# Patient Record
Sex: MALE | Race: WHITE | Employment: FULL TIME | URBAN - METROPOLITAN AREA
[De-identification: names, ages, dates, MRNs, and addresses within clinical notes are randomized per-mention and may not be internally consistent; named-entity substitution may affect disease eponyms.]

---

## 2018-11-11 ENCOUNTER — HOSPITAL ENCOUNTER (OUTPATIENT)
Dept: ULTRASOUND IMAGING | Age: 28
Discharge: HOME | End: 2018-11-11
Payer: COMMERCIAL

## 2018-11-11 DIAGNOSIS — R10.2 PELVIC PAIN IN MALE: ICD-10-CM

## 2018-11-11 PROCEDURE — 76705 ECHO EXAM OF ABDOMEN: CPT

## 2019-02-21 ENCOUNTER — OFFICE VISIT (OUTPATIENT)
Dept: ORTHOPEDIC SURGERY | Age: 29
End: 2019-02-21
Payer: COMMERCIAL

## 2019-02-21 VITALS — BODY MASS INDEX: 25.12 KG/M2 | WEIGHT: 160.05 LBS | HEIGHT: 67 IN

## 2019-02-21 DIAGNOSIS — S76.011A STRAIN OF FLEXOR MUSCLE OF RIGHT HIP, INITIAL ENCOUNTER: ICD-10-CM

## 2019-02-21 DIAGNOSIS — M25.551 RIGHT HIP PAIN: Primary | ICD-10-CM

## 2019-02-21 PROCEDURE — 99203 OFFICE O/P NEW LOW 30 MIN: CPT | Performed by: PHYSICIAN ASSISTANT

## 2019-02-21 RX ORDER — DICLOFENAC SODIUM 75 MG/1
75 TABLET, DELAYED RELEASE ORAL 2 TIMES DAILY WITH MEALS
Qty: 40 TABLET | Refills: 0 | Status: SHIPPED | OUTPATIENT
Start: 2019-02-21 | End: 2020-10-15 | Stop reason: ALTCHOICE

## 2020-06-17 ENCOUNTER — OFFICE VISIT (OUTPATIENT)
Dept: ORTHOPEDIC SURGERY | Age: 30
End: 2020-06-17

## 2020-06-17 VITALS — HEIGHT: 67 IN | WEIGHT: 160 LBS | BODY MASS INDEX: 25.11 KG/M2

## 2020-06-17 PROCEDURE — 99203 OFFICE O/P NEW LOW 30 MIN: CPT | Performed by: PHYSICIAN ASSISTANT

## 2020-06-17 NOTE — PROGRESS NOTES
Remember that it may take several business days to pre-cert your MRI through your insurance. Our office will contact you as soon as we have the approval. We will not give any test results over the phone. Please call [phone] once you have your test day and time to schedule a follow up with Dr. Isatu Taylor. Order Specific Question:   Reason for exam:     Answer:   r/o mmt       Treatment Plan: I would recommend an MRI in his right knee as a suspect medial meniscus tear. We will have him follow-up with 1 of our sports medicine physicians after the MRIs been completed discuss the results and go over the options for treatment.

## 2020-06-24 ENCOUNTER — OFFICE VISIT (OUTPATIENT)
Dept: ORTHOPEDIC SURGERY | Age: 30
End: 2020-06-24

## 2020-06-24 PROCEDURE — 99213 OFFICE O/P EST LOW 20 MIN: CPT | Performed by: PHYSICIAN ASSISTANT

## 2020-06-24 NOTE — PROGRESS NOTES
Chief Complaint    Knee Pain (rt knee pain after a soccer injury few months back, seen in [address] [place] [address] on 6/17/20; has MRI)      History of Present Illness:  Donnie Snow is a 34 y.o. male returns today for a follow-up on his right knee after undergoing a recent MRI. This was a result of an injury where the patient reports that back in February he was playing soccer when he attempted to make cutting move and had his right foot planted. At the same time another player and he may contact with their knees and he had a twisting injury. Results of the MRI indicate there is a subacute/chronic low-grade sprain of the MCL without any active inflammation. There is no evidence of any traumatic medial lateral meniscus tears. There is some mild patellofemoral maltracking present as well. Pain Assessment  Location of Pain: Knee  Location Modifiers: Right  Severity of Pain: 0  Aggravating Factors: Standing, Walking, Bending  Limiting Behavior: Some  Result of Injury: Yes  Work-Related Injury: No  Are there other pain locations you wish to document?: No    Medical History:  No past medical history on file.   Patient Active Problem List    Diagnosis Date Noted    Strain of flexor muscle of right hip 02/21/2019    Peroneal tendonitis 04/23/2013     Social History     Socioeconomic History    Marital status: Single     Spouse name: Not on file    Number of children: Not on file    Years of education: Not on file    Highest education level: Not on file   Occupational History    Not on file   Social Needs    Financial resource strain: Not on file    Food insecurity     Worry: Not on file     Inability: Not on file    Transportation needs     Medical: Not on file     Non-medical: Not on file   Tobacco Use    Smoking status: Never Smoker    Smokeless tobacco: Never Used   Substance and Sexual Activity    Alcohol use: No    Drug use: No    Sexual activity: Not on file   Lifestyle    Physical activity     Days per

## 2020-10-15 ENCOUNTER — OFFICE VISIT (OUTPATIENT)
Dept: ORTHOPEDIC SURGERY | Age: 30
End: 2020-10-15

## 2020-10-15 VITALS — HEIGHT: 67 IN | BODY MASS INDEX: 25.11 KG/M2 | WEIGHT: 160 LBS

## 2020-10-15 PROCEDURE — 99213 OFFICE O/P EST LOW 20 MIN: CPT | Performed by: PHYSICIAN ASSISTANT

## 2020-10-15 NOTE — PROGRESS NOTES
CHIEF COMPLAINT:    Chief Complaint   Patient presents with    Ankle Pain     LEFT ANKLE-ACHILLES. ABOUT 12 WEEKS AGO INJURED SOCCER       HISTORY OF PRESENT ILLNESS:                The patient is a 31 y.o. male presents today for left ankle evaluation. He had an injury 12 weeks ago while playing soccer. He collided with an opponent and plantarflexed and inverted his left ankle. He denies feeling a pop or crack at the time of injury. He has had pain on the lateral aspect of the ankle as well as in his Achilles. He is treated his symptoms with ice, anti-inflammatory medications, elevation, and activity modification. Since he has had continued pain, he decided to have his ankle evaluated. He is a prior history of a Sullivan fracture in 2013 that required ORIF. No past medical history on file. The pain assessment was noted & is as follows:  Pain Assessment  Location of Pain: Ankle  Location Modifiers: Left  Severity of Pain: 3  Quality of Pain: Sharp, Aching, Dull  Duration of Pain: A few minutes  Frequency of Pain: Several times daily  Aggravating Factors: Exercise, Stairs, Bending, Stretching  Limiting Behavior: Some  Relieving Factors: Rest, Ice  Result of Injury: Yes  Work-Related Injury: No  Are there other pain locations you wish to document?: No]      Work Status/Functionality:     Past Medical History: Medical history form was reviewed today & can be found in the media tab  No past medical history on file. Past Surgical History:     No past surgical history on file. Current Medications:     Current Outpatient Medications:     Loratadine (CLARITIN PO), Take  by mouth., Disp: , Rfl:   Allergies:  Patient has no known allergies. Social History:    reports that he has never smoked. He has never used smokeless tobacco. He reports that he does not drink alcohol or use drugs. Family History:   No family history on file.     REVIEW OF SYSTEMS:   Pertinent items are noted in HPI  Review of systems reviewed from Patient History Form dated on October 15, 2020 and available in the patient's chart under the Media tab. CONSTITUTIONAL: Denies unexplained weight loss, fevers, chills or fatigue  NEUROLOGICAL: Denies unsteady gait or progressive weakness  SKIN: Denies skin changes, delayed healing, rash, itching     PHYSICAL EXAMINATION:   Vitals: Height 5' 7.01\" (1.702 m), weight 160 lb (72.6 kg). GENERAL EXAM:  ? General Apparence: Patient is adequately groomed with no evidence of malnutrition. ? Orientation: The patient is oriented to time, place and person. ? Mood & Affect:The patient's mood and affect are appropriate     PHYSICAL EXAMINATION:  Inspection of the left ankle reveals warm, dry, intact skin. There is no adenopathy. The distal neurovascular exam is grossly intact. There is 2+ effusion about the left ankle. There is some tenderness in the lateral aspect the left ankle. There is no tenderness over the anterior tib-fib joint. Range of motion is severely limited in all planes. He has 5 out of 5 strength in all muscle groups. There is 2+ laxity with an anterior drawer test.    Examination of the contralateral ankle reveals intact skin. There is no swelling, warmth, or erythema. Range of motion is within normal limits. There is no tenderness to palpation about the joint line, lateral ligamentous complex, deltoid ligament, syndesmosis, or Achilles tendon. Strength is graded 5/5 in all muscle groups. Provocative instability tests are negative. The distal neurovascular exam is grossly intact. Examination of the lumbar spine reveals that the skin is warm and dry. There is no swelling, warmth, or erythema. Range of motion is within normal limits. There is no paraspinal or spinous process tenderness. Ipsilateral and contralateral straight leg raising tests are negative. The distal neurovascular exam is grossly intact and symmetric. X-RAYS: 3 views of the left ankle are reviewed.  There is no periosteal reaction, medullary lesions, or osteopenia. The mortise is intact, although there may be some mild widening. No fractures or dislocations are visualized. There is a retained metal screw from previous Sullivan fracture fixation. ASSESSMENT: Left ankle inversion and high ankle sprain. PLAN: I had a detailed discussion with Abdiel Malloy. I recommended continue to use ice, elevation, anti-inflammatory medications, and activity modification. I recommended a course of physical therapy to help decrease his swelling and maximize his range of motion and strength. I recommended a follow-up evaluation with Dr. Sammy Luis or Dr. Thiago Reyes in the next 3 weeks. He is in agreement with this plan.         Brooklyn Chery ATC, PA-C

## 2020-11-03 ENCOUNTER — OFFICE VISIT (OUTPATIENT)
Dept: ORTHOPEDIC SURGERY | Age: 30
End: 2020-11-03

## 2020-11-03 VITALS — HEIGHT: 67 IN | WEIGHT: 160.05 LBS | BODY MASS INDEX: 25.12 KG/M2

## 2020-11-03 PROCEDURE — 99203 OFFICE O/P NEW LOW 30 MIN: CPT | Performed by: ORTHOPAEDIC SURGERY

## 2020-11-03 RX ORDER — MELOXICAM 15 MG/1
15 TABLET ORAL DAILY
Qty: 30 TABLET | Refills: 1 | Status: SHIPPED | OUTPATIENT
Start: 2020-11-03 | End: 2021-03-03

## 2020-11-03 NOTE — PROGRESS NOTES
ankle    Palpation: Tenderness over the anterior lateral aspect of the ankle and posterior medial.  There is no tenderness over the syndesmosis    Range of Motion: Tightness into dorsiflexion on the left compared to the right    Strength: Within normal limits    Special Tests: Decreased proprioception on the left side compared to the right anterior drawer and talar tilt show laxity on the left compared to the right    Skin: There are no rashes, ulcerations or lesions. Gait: Antalgic    Reflex 2+ and symmetric    Additional Comments:       Additional Examinations:         Right Lower Extremity: Examination of the right lower extremity does not show any tenderness, deformity or injury. Range of motion is unremarkable. There is no gross instability. There are no rashes, ulcerations or lesions. Strength and tone are normal.    Radiology:     X-rays obtained and reviewed in office:  Views 3  Location left ankle  Impression obtained previously shows no evidence of fracture or osteochondral defect mortise is well reduced          Assessment : This patient has a recurrent ankle sprain which is slow to resolve. He has been running on this did not use his brace. Has not really done much to take care of it    Impression:  No diagnosis found. Office Procedures:  No orders of the defined types were placed in this encounter. Treatment Plan:  The etiology of ankle effusion and instability and it's appropriate treatment were discussed in great detail. Out his plan of care and copied it to the media section of his chart I gave him meloxicam 15 mg that he will take for just a short period of time and discussed the side effects. Gave him a prescription for physical therapy 1 time for home program and recommended he use a brace on a regular basis until his achiness goes down. He needs to stop running and will do crosstraining.   Follow-up with me as needed if he still has problems in the future he will contact us and I would scan him

## 2020-12-02 ENCOUNTER — PATIENT MESSAGE (OUTPATIENT)
Dept: ORTHOPEDIC SURGERY | Age: 30
End: 2020-12-02

## 2020-12-10 ENCOUNTER — TELEPHONE (OUTPATIENT)
Dept: ORTHOPEDIC SURGERY | Age: 30
End: 2020-12-10

## 2020-12-15 ENCOUNTER — HOSPITAL ENCOUNTER (OUTPATIENT)
Dept: PHYSICAL THERAPY | Age: 30
Setting detail: THERAPIES SERIES
Discharge: HOME | End: 2020-12-15

## 2020-12-15 PROCEDURE — 97110 THERAPEUTIC EXERCISES: CPT | Performed by: PHYSICAL THERAPIST

## 2020-12-15 PROCEDURE — 97161 PT EVAL LOW COMPLEX 20 MIN: CPT | Performed by: PHYSICAL THERAPIST

## 2020-12-15 NOTE — FLOWSHEET NOTE
[facility], [address] [place]  Phone: [phone]  Fax [phone]    Physical Therapy Treatment Note/ Progress Report:           Date:  12/15/2020    Patient Name:  Author Verduzco    :  1990  MRN: [id number]  Restrictions/Precautions:    Medical/Treatment Diagnosis Information:  · Diagnosis: Left ankle pain (M25.572)  · Treatment Diagnosis: Left ankle pain (S61.106)  Insurance/Certification information:  PT Insurance Information: Self pay  Physician Information:  Referring Practitioner: Carlo Beckford  Has the plan of care been signed (Y/N):        []  Yes  [x]  No     Date of Patient follow up with Physician: not scheduled      Is this a Progress Report:     []  Yes  [x]  No        If Yes:  Date Range for reporting period:  Beginnin/15/2020  Ending    Progress report will be due (10 Rx or 30 days whichever is less):        Recertification will be due (POC Duration  / 90 days whichever is less):       Visit # Insurance Allowable Auth Required   In-person 1 Self pay []  Yes []  No    Telehealth   []  Yes []  No    Total          Functional Scale: LEFS 59/80 (26%)    Date assessed:  12/15/2020      Therapy Diagnosis/Practice Pattern: D      Number of Comorbidities:  [x]0     []1-2    []3+    Latex Allergy:  [x]NO      []YES  Preferred Language for Healthcare:   [x]English       []other:      Pain level:  0-7/10     SUBJECTIVE:  See eval    OBJECTIVE: See eval   Observation:    Test measurements:      RESTRICTIONS/PRECAUTIONS:  none    Exercises/Interventions:     Therapeutic Ex (98945) Sets/sec Reps Notes/CUES   Standing gastroc stretch 30\" 3    Standing soleus stretch 30\" 3    BTB ankle EV/INV 2 10 ea    DHR 2 10    SLR abduction 2 10     Clamshells RTB 2 10                Patient ed   HEP, ice, GAP                     Manual Intervention (..97.60)                                          NMR re-education (76990)   CUES NEEDED   SLB 15\" 5x                                                    Therapeutic Activity (73627)                                                Therapeutic Exercise and NMR EXR  [x] (62471) Provided verbal/tactile cueing for activities related to strengthening, flexibility, endurance, ROM for improvements in LE, proximal hip, and core control with self care, mobility, lifting, ambulation.  [] (09717) Provided verbal/tactile cueing for activities related to improving balance, coordination, kinesthetic sense, posture, motor skill, proprioception  to assist with LE, proximal hip, and core control in self care, mobility, lifting, ambulation and eccentric single leg control.      NMR and Therapeutic Activities:    [] (63795 or 59558) Provided verbal/tactile cueing for activities related to improving balance, coordination, kinesthetic sense, posture, motor skill, proprioception and motor activation to allow for proper function of core, proximal hip and LE with self care and ADLs  [] (39651) Gait Re-education- Provided training and instruction to the patient for proper LE, core and proximal hip recruitment and positioning and eccentric body weight control with ambulation re-education including up and down stairs     Home Exercise Program:    [x] (21761) Reviewed/Progressed HEP activities related to strengthening, flexibility, endurance, ROM of core, proximal hip and LE for functional self-care, mobility, lifting and ambulation/stair navigation   [] (58197)Reviewed/Progressed HEP activities related to improving balance, coordination, kinesthetic sense, posture, motor skill, proprioception of core, proximal hip and LE for self care, mobility, lifting, and ambulation/stair navigation      Manual Treatments:  PROM / STM / Oscillations-Mobs:  G-I, II, III, IV (PA's, Inf., Post.)  [] (12134) Provided manual therapy to mobilize LE, proximal hip and/or LS spine soft tissue/joints for the purpose of modulating increased symptoms or restriction. []? Progressing: []? Met: []? Not Met: []? Adjusted  5. Patient will progress to [employer] for return to running or sport. (patient specific functional goal)    []? Progressing: []? Met: []? Not Met: []? Adjusted         Progression Towards Functional goals:  [] Patient is progressing as expected towards functional goals listed. [] Progression is slowed due to complexities listed. [] Progression has been slowed due to co-morbidities. [x] Plan just implemented, too soon to assess goals progression  [] Other:         Overall Progression Towards Functional goals/ Treatment Progress Update:  [] Patient is progressing as expected towards functional goals listed. [] Progression is slowed due to complexities/Impairments listed. [] Progression has been slowed due to co-morbidities.   [x] Plan just implemented, too soon to assess goals progression <30days   [] Goals require adjustment due to lack of progress  [] Patient is not progressing as expected and requires additional follow up with physician  [] Other    Prognosis for POC: [x] Good [] Fair  [] Poor      Patient requires continued skilled intervention: [x] Yes  [] No    Treatment/Activity Tolerance:  [x] Patient able to complete treatment  [] Patient limited by fatigue  [] Patient limited by pain    [] Patient limited by other medical complications  [] Other:     ASSESSMENT:  See lili     Return to Play: (if applicable)   []  Stage 1: Intro to Strength   []  Stage 2: Return to Run and Strength   []  Stage 3: Return to Jump and Strength   []  Stage 4: Dynamic Strength and Agility   []  Stage 5: Sport Specific Training     []  Ready to Return to Play, Meets All Above Stages   []  Not Ready for Return to Sports   Comments:                               PLAN: See eval  [] Continue per plan of care [] Alter current plan (see comments above)  [x] Plan of care initiated [] Hold pending MD visit [] Discharge      Electronically signed by: Jenny Washington, PT, DPT [id number]     Note: If patient does not return for scheduled/ recommended follow up visits, this note will serve as a discharge from care along with most recent update on progress.

## 2020-12-15 NOTE — PLAN OF CARE
[facility], [address] [facility]  [address] [place] [address]  Phone: [phone]  Fax [phone]     Physical Therapy Certification    Dear Referring Practitioner: Fantasma Foster,    We had the pleasure of evaluating the following patient for physical therapy services at [address] [place] [address]. A summary of our findings can be found in the initial assessment below. This includes our plan of care. If you have any questions or concerns regarding these findings, please do not hesitate to contact me at the office phone number checked above. Thank you for the referral.       Physician Signature:_______________________________Date:__________________  By signing above (or electronic signature), therapists plan is approved by physician    Patient: Razia Pierre   : 1990   MRN: [id number]  Referring Physician: Referring Practitioner: Fantasma Foster      Evaluation Date: 12/15/2020      Medical Diagnosis Information:  Diagnosis: Left ankle pain (M25.572)   Treatment Diagnosis: Left ankle pain (M25.572)                                         Insurance information: PT Insurance Information: Self pay       Precautions/ Contra-indications: none    C-SSRS Triggered by Intake questionnaire (Past 2 wk assessment):   [x] No, Questionnaire did not trigger screening.   [] Yes, Patient intake triggered further evaluation      [] C-SSRS Screening completed  [] PCP notified via Plan of Care  [] Emergency services notified     Latex Allergy:  [x]NO      []YES  Preferred Language for Healthcare:   [x]English       []other:    SUBJECTIVE: Patient stated complaint: Patient reports he has had off and on pain in his left ankle over the years. Most recently, sprained his ankle about 6 months ago playing soccer when someone stepped on his ankle when he planted, and his foot when inward. Reports immediate pain and had to stop playing.  Became pretty swollen and he had trouble putting weight on it for a few days. Wore a boot for a day or two and then was able to walk a little better. Continued to work a physical job, which did not help much with the recovery. Reports that currently, it is still painful on the outside of the ankle and in the back near his achilles. Relevant Medical History: no significant PMH  Functional Disability Index:  LEFS 59/80(26%)    Height: 5'7 Weight: 160  Pain Scale: 0-7/10  Easing factors: ice, rest, meloxicam   Provocative factors: turning foot inward, running, prolonged walking or standing activities, changing directions      Type: []Constant   []Intermittent  []Radiating []Localized []other:     Numbness/Tingling: denies N/T    Occupation/School: works as a ; on his feet a lot    Living Status/Prior Level of Function: Independent with ADLs and IADLs, wants to return to playing soccer and lifting    OBJECTIVE:     ROM LEFT RIGHT   HIP Flex     HIP Abd     HIP Ext     HIP IR     HIP ER     Knee ext     Knee Flex     Ankle PF 35    Ankle DF 5    Ankle In 40    Ankle Ev 5    Strength  LEFT RIGHT   HIP Flexors     HIP Abductors     HIP Ext     Hip ER     Knee EXT (quad)     Knee Flex (HS)     Ankle DF 5/5    Ankle PF 4/5    Ankle Inv 4+/5    Ankle EV 4+/5         Circumference  Mid apex  7 cm prox             Reflexes/Sensation:    [x]Dermatomes/Myotomes intact    []Reflexes equal and normal bilaterally   []Other:    Joint mobility:    []Normal    [x]Hypo   []Hyper    Palpation: no significant TTP; palpable tightness gastroc    Functional Mobility/Transfers: independent    Posture: fair    Bandages/Dressings/Incisions: N/A    Gait: (include devices/WB status) decreased tibial advancement over talus LLE, causing decreased step length    Orthopedic Special Tests: talar tilt -                       [x] Patient history, allergies, meds reviewed. Medical chart reviewed. See intake form.      Review Of Systems (ROS):  [x]Performed Review of systems (Integumentary, CardioPulmonary, Neurological) by intake and observation. Intake form has been scanned into medical record. Patient has been instructed to contact their primary care physician regarding ROS issues if not already being addressed at this time. Co-morbidities/Complexities (which will affect course of rehabilitation):   [x]None           Arthritic conditions   []Rheumatoid arthritis (M05.9)  []Osteoarthritis (M19.91)   Cardiovascular conditions   []Hypertension (I10)  []Hyperlipidemia (E78.5)  []Angina pectoris (I20)  []Atherosclerosis (I70)   Musculoskeletal conditions   []Disc pathology   []Congenital spine pathologies   []Prior surgical intervention  []Osteoporosis (M81.8)  []Osteopenia (M85.8)   Endocrine conditions   []Hypothyroid (E03.9)  []Hyperthyroid Gastrointestinal conditions   []Constipation (V33.32)   Metabolic conditions   []Morbid obesity (E66.01)  []Diabetes type 1(E10.65) or 2 (E11.65)   []Neuropathy (G60.9)     Pulmonary conditions   []Asthma (J45)  []Coughing   []COPD (J44.9)   Psychological Disorders  []Anxiety (F41.9)  []Depression (F32.9)   []Other:   []Other:          Barriers to/and or personal factors that will affect rehab potential:              [x]Age  []Sex              []Motivation/Lack of Motivation                        [x]Co-Morbidities              []Cognitive Function, education/learning barriers              []Environmental, home barriers              []profession/work barriers  []past PT/medical experience  []other:  Justification: should progress well     Falls Risk Assessment (30 days):   [x] Falls Risk assessed and no intervention required.   [] Falls Risk assessed and Patient requires intervention due to being higher risk   TUG score (>12s at risk):     [] Falls education provided, including         ASSESSMENT: Patient demonstrates decreased ROM, strength, and stability in his left ankle, limiting his ability to complete prolonged walking and standing or higher level activity without pain. Will benefit from skilled PT to address limitations. Functional Impairments:     [x]Noted lumbar/proximal hip/LE joint hypomobility   [x]Decreased LE functional ROM   [x]Decreased core/proximal hip strength and neuromuscular control   [x]Decreased LE functional strength   [x]Reduced balance/proprioceptive control   []other:      Functional Activity Limitations (from functional questionnaire and intake)   []Reduced ability to tolerate prolonged functional positions   []Reduced ability or difficulty with changes of positions or transfers between positions   []Reduced ability to maintain good posture and demonstrate good body mechanics with sitting, bending, and lifting   []Reduced ability to sleep   [] Reduced ability or tolerance with driving and/or computer work   []Reduced ability to perform lifting, carrying tasks   [x]Reduced ability to squat   []Reduced ability to forward bend   [x]Reduced ability to ambulate prolonged functional periods/distances/surfaces   []Reduced ability to ascend/descend stairs   [x]Reduced ability to run, hop, cut or jump   []other:    Participation Restrictions   []Reduced participation in self care activities   []Reduced participation in home management activities   []Reduced participation in work activities   []Reduced participation in social activities. [x]Reduced participation in sport/recreation activities. Classification :    []Signs/symptoms consistent with post-surgical status including decreased ROM, strength and function.    [x]Signs/symptoms consistent with joint sprain/strain   []Signs/symptoms consistent with patella-femoral syndrome   []Signs/symptoms consistent with knee OA/hip OA   []Signs/symptoms consistent with internal derangement of knee/Hip   []Signs/symptoms consistent with functional hip weakness/NMR control      []Signs/symptoms consistent with tendinitis/tendinosis    []signs/symptoms consistent with pathology which may benefit from Dry needling      []other:      Prognosis/Rehab Potential:      []Excellent   [x]Good    []Fair   []Poor    Tolerance of evaluation/treatment:    []Excellent   []Good    [x]Fair   []Poor  Physical Therapy Evaluation Complexity Justification  [x] A history of present problem with:  [x] no personal factors and/or comorbidities that impact the plan of care;  []1-2 personal factors and/or comorbidities that impact the plan of care  []3 personal factors and/or comorbidities that impact the plan of care  [x] An examination of body systems using standardized tests and measures addressing any of the following: body structures and functions (impairments), activity limitations, and/or participation restrictions;:  [x] a total of 1-2 or more elements   [] a total of 3 or more elements   [] a total of 4 or more elements   [x] A clinical presentation with:  [x] stable and/or uncomplicated characteristics   [] evolving clinical presentation with changing characteristics  [] unstable and unpredictable characteristics;   [x] Clinical decision making of [x] low, [] moderate, [] high complexity using standardized patient assessment instrument and/or measurable assessment of functional outcome. [x] EVAL (LOW) 49970 (typically 20 minutes face-to-face)  [] EVAL (MOD) 74207 (typically 30 minutes face-to-face)  [] EVAL (HIGH) 51867 (typically 45 minutes face-to-face)  [] RE-EVAL       PLAN:   Frequency/Duration:  1 days per week for 6 Weeks:  Interventions:  [x]  Therapeutic exercise including: strength training, ROM, for Lower extremity and core   [x]  NMR activation and proprioception for LE, Glutes and Core   [x]  Manual therapy as indicated for LE, Hip and spine to include: Dry Needling/IASTM, STM, PROM, Gr I-IV mobilizations, manipulation.    [x] Modalities as needed that may include: thermal agents, E-stim, Biofeedback, US, iontophoresis as indicated  [x] Patient education on joint protection, postural re-education, activity modification, progression of HEP. HEP instruction: (see scanned forms)    GOALS:  Patient stated goal: \"Return to sports\"  [] Progressing: [] Met: [] Not Met: [] Adjusted    Therapist goals for Patient:   Short Term Goals: To be achieved in: 2 weeks  1. Independent in HEP and progression per patient tolerance, in order to prevent re-injury. [] Progressing: [] Met: [] Not Met: [] Adjusted  2. Patient will have a decrease in pain to facilitate improvement in movement, function, and ADLs as indicated by Functional Deficits. [] Progressing: [] Met: [] Not Met: [] Adjusted    Long Term Goals: To be achieved in: 6 weeks  1. Disability index score of 13% or less for the LEFS to assist with reaching prior level of function. [] Progressing: [] Met: [] Not Met: [] Adjusted  2. Patient will demonstrate increased AROM left ankle DF to 10 degrees, left eversion 10 degrees to allow for proper joint functioning as indicated by patients Functional Deficits. [] Progressing: [] Met: [] Not Met: [] Adjusted  3. Patient will demonstrate an increase in Strength in left ankle PF to 4+/5 to allow for proper functional mobility as indicated by patients Functional Deficits. [] Progressing: [] Met: [] Not Met: [] Adjusted  4. Patient will be able to balance on uneven surface for 1 minute without increased symptoms or restriction. [] Progressing: [] Met: [] Not Met: [] Adjusted  5.  Patient will progress to [facility] for return to running or sport. (patient specific functional goal)    [] Progressing: [] Met: [] Not Met: [] Adjusted     Electronically signed by:  Jonathan Maldonado PT, DPT [id number]

## 2020-12-22 ENCOUNTER — HOSPITAL ENCOUNTER (OUTPATIENT)
Dept: PHYSICAL THERAPY | Age: 30
Setting detail: THERAPIES SERIES
Discharge: HOME | End: 2020-12-22

## 2020-12-22 PROCEDURE — 97140 MANUAL THERAPY 1/> REGIONS: CPT | Performed by: PHYSICAL THERAPIST

## 2020-12-22 PROCEDURE — 97110 THERAPEUTIC EXERCISES: CPT | Performed by: PHYSICAL THERAPIST

## 2020-12-22 NOTE — FLOWSHEET NOTE
[facility],  [address]  Phone: [phone]  Fax [phone]    Physical Therapy Treatment Note/ Progress Report:           Date:  2020    Patient Name:  Dwight Jones    :  1990  MRN: [id number]  Restrictions/Precautions:    Medical/Treatment Diagnosis Information:  · Diagnosis: Left ankle pain (M25.572)  · Treatment Diagnosis: Left ankle pain (I83.405)  Insurance/Certification information:  PT Insurance Information: Self pay  Physician Information:  Referring Practitioner: Sammy Luis  Has the plan of care been signed (Y/N):        []  Yes  [x]  No     Date of Patient follow up with Physician: not scheduled      Is this a Progress Report:     []  Yes  [x]  No        If Yes:  Date Range for reporting period:  Beginnin/15/2020  Ending    Progress report will be due (10 Rx or 30 days whichever is less):        Recertification will be due (POC Duration  / 90 days whichever is less):       Visit # Insurance Allowable Auth Required   In-person 2 Self pay []  Yes []  No    Telehealth   []  Yes []  No    Total          Functional Scale: LEFS 59/80 (26%)    Date assessed:  12/15/2020      Therapy Diagnosis/Practice Pattern: D      Number of Comorbidities:  [x]0     []1-2    []3+    Latex Allergy:  [x]NO      []YES  Preferred Language for Healthcare:   [x]English       []other:      Pain level:  0-3/10     SUBJECTIVE:  Patient reports his ankle seems about the same. Has been doing exercises regularly. Has been resting it as instructed. Gets a little sore at times, but not much pain lately.      OBJECTIVE:    Observation: tightness with posterior talar mobility    Test measurements:  NT this date    RESTRICTIONS/PRECAUTIONS:  none    Exercises/Interventions:     Therapeutic Ex (19333) Sets/sec Reps Notes/CUES   Incline stretch gastroc and soleus 30\" 3 ea gentle         Seated soleus pumps 40# 2 10 ecc L                   Eccentric heel raises 2 10                LBW RVL  2 laps  15'    Monster walks RVL 2 laps 15'          Squats 3\" 20x          Manual Intervention (70653)      IASTM using HG Pro fanning, sweeping, and strumming to gastroc, soleus, brushing and strumming to achilles  10'     Posterior talar mobilizations (static and MWM with lunge) 5'                             NMR re-education (78312)   CUES NEEDED   SLB on airex 30\" 3                                                    Therapeutic Activity (10163)                                                Therapeutic Exercise and NMR EXR  [x] (75257) Provided verbal/tactile cueing for activities related to strengthening, flexibility, endurance, ROM for improvements in LE, proximal hip, and core control with self care, mobility, lifting, ambulation.  [] (68383) Provided verbal/tactile cueing for activities related to improving balance, coordination, kinesthetic sense, posture, motor skill, proprioception  to assist with LE, proximal hip, and core control in self care, mobility, lifting, ambulation and eccentric single leg control.      NMR and Therapeutic Activities:    [x] (57224 or 81199) Provided verbal/tactile cueing for activities related to improving balance, coordination, kinesthetic sense, posture, motor skill, proprioception and motor activation to allow for proper function of core, proximal hip and LE with self care and ADLs  [] (51616) Gait Re-education- Provided training and instruction to the patient for proper LE, core and proximal hip recruitment and positioning and eccentric body weight control with ambulation re-education including up and down stairs     Home Exercise Program:    [x] (75570) Reviewed/Progressed HEP activities related to strengthening, flexibility, endurance, ROM of core, proximal hip and LE for functional self-care, mobility, lifting and ambulation/stair navigation   [] (57594)Reviewed/Progressed HEP activities related to joint functioning as indicated by patients Functional Deficits. []? Progressing: []? Met: []? Not Met: []? Adjusted  3. Patient will demonstrate an increase in Strength in left ankle PF to 4+/5 to allow for proper functional mobility as indicated by patients Functional Deficits. []? Progressing: []? Met: []? Not Met: []? Adjusted  4. Patient will be able to balance on uneven surface for 1 minute without increased symptoms or restriction. []? Progressing: []? Met: []? Not Met: []? Adjusted  5. Patient will progress to [facility] for return to running or sport. (patient specific functional goal)    []? Progressing: []? Met: []? Not Met: []? Adjusted           Overall Progression Towards Functional goals/ Treatment Progress Update:  [] Patient is progressing as expected towards functional goals listed. [] Progression is slowed due to complexities/Impairments listed. [] Progression has been slowed due to co-morbidities. [x] Plan just implemented, too soon to assess goals progression <30days   [] Goals require adjustment due to lack of progress  [] Patient is not progressing as expected and requires additional follow up with physician  [] Other    Prognosis for POC: [x] Good [] Fair  [] Poor      Patient requires continued skilled intervention: [x] Yes  [] No    Treatment/Activity Tolerance:  [x] Patient able to complete treatment  [] Patient limited by fatigue  [] Patient limited by pain    [] Patient limited by other medical complications  [] Other:     ASSESSMENT:  Patient with tightness in talus as well as soleus, limiting tibial advancement during gait. Tolerated increased TE well.  Will progress to [facility] over the next 2-3 weeks as able due to patient being self-pay    Return to Play: (if applicable)   []  Stage 1: Intro to Strength   []  Stage 2: Return to Run and Strength   []  Stage 3: Return to Jump and Strength   []  Stage 4: Dynamic Strength and Agility   []  Stage 5: Sport Specific Training     []  Ready to Return to Play, Meets All Above Stages   []  Not Ready for Return to Sports   Comments:                               PLAN: See eval  [x] Continue per plan of care [] Alter current plan (see comments above)  [] Plan of care initiated [] Hold pending MD visit [] Discharge      Electronically signed by:  Idania Dixon, PT, DPT [id number]     Note: If patient does not return for scheduled/ recommended follow up visits, this note will serve as a discharge from care along with most recent update on progress.

## 2020-12-29 ENCOUNTER — HOSPITAL ENCOUNTER (OUTPATIENT)
Dept: PHYSICAL THERAPY | Age: 30
Setting detail: THERAPIES SERIES
Discharge: HOME | End: 2020-12-29

## 2020-12-29 PROCEDURE — 97140 MANUAL THERAPY 1/> REGIONS: CPT | Performed by: PHYSICAL THERAPIST

## 2020-12-29 PROCEDURE — 97110 THERAPEUTIC EXERCISES: CPT | Performed by: PHYSICAL THERAPIST

## 2020-12-29 NOTE — FLOWSHEET NOTE
[facility] [facility], [address]  Phone: [phone]  Fax [phone]    Physical Therapy Treatment Note/ Progress Report:           Date:  2020    Patient Name:  Dev Novoa    :  1990  MRN: [id number]  Restrictions/Precautions:    Medical/Treatment Diagnosis Information:  · Diagnosis: Left ankle pain (M25.572)  · Treatment Diagnosis: Left ankle pain (C81.323)  Insurance/Certification information:  PT Insurance Information: Self pay  Physician Information:  Referring Practitioner: Daron Zhang  Has the plan of care been signed (Y/N):        []  Yes  [x]  No     Date of Patient follow up with Physician: not scheduled      Is this a Progress Report:     []  Yes  [x]  No        If Yes:  Date Range for reporting period:  Beginnin/15/2020  Ending    Progress report will be due (10 Rx or 30 days whichever is less): 2993       Recertification will be due (POC Duration  / 90 days whichever is less):       Visit # Insurance Allowable Auth Required   In-person 2 Self pay []  Yes []  No    Telehealth   []  Yes []  No    Total          Functional Scale: LEFS 59/80 (26%)    Date assessed:  12/15/2020      Therapy Diagnosis/Practice Pattern: D      Number of Comorbidities:  [x]0     []1-2    []3+    Latex Allergy:  [x]NO      []YES  Preferred Language for Healthcare:   [x]English       []other:      Pain level:  0/10, up to 2/10 when he does higher level activities. SUBJECTIVE:  Patient reports his ankle was less swollen, but he was playing around with a soccer ball and it got a little sore.      OBJECTIVE:    Observation: tightness with posterior talar mobility    Test measurements:  AROM DF 8 deg, EV 5 deg; PF MMT: 4+/5    RESTRICTIONS/PRECAUTIONS:  none    Exercises/Interventions:     Therapeutic Ex (32616) Sets/sec Reps Notes/CUES   Incline stretch gastroc and soleus 30\" 3 ea gentle         Seated soleus pumps 40# 2 15 ecc L                   SL heel raises L 2 10                      Glider lunge posterior 2 10 L    Squats on DD 3\" 20x    Lunge onto BOSU 5\" 10    Manual Intervention (18226)      IASTM using HG Pro fanning, sweeping, and strumming to gastroc, soleus, brushing and strumming to achilles  10'     Posterior talar mobilizations (static and MWM with lunge) 5'                             NMR re-education (19960)   CUES NEEDED   Step to SLB on airex 5\" 10x ea Forward, lateral         LSU and over BOSU 1 10                                        Therapeutic Activity (01419)                                                Therapeutic Exercise and NMR EXR  [x] (92547) Provided verbal/tactile cueing for activities related to strengthening, flexibility, endurance, ROM for improvements in LE, proximal hip, and core control with self care, mobility, lifting, ambulation.  [] (34660) Provided verbal/tactile cueing for activities related to improving balance, coordination, kinesthetic sense, posture, motor skill, proprioception  to assist with LE, proximal hip, and core control in self care, mobility, lifting, ambulation and eccentric single leg control.      NMR and Therapeutic Activities:    [x] (90420 or 13015) Provided verbal/tactile cueing for activities related to improving balance, coordination, kinesthetic sense, posture, motor skill, proprioception and motor activation to allow for proper function of core, proximal hip and LE with self care and ADLs  [] (23944) Gait Re-education- Provided training and instruction to the patient for proper LE, core and proximal hip recruitment and positioning and eccentric body weight control with ambulation re-education including up and down stairs     Home Exercise Program:    [x] (41551) Reviewed/Progressed HEP activities related to strengthening, flexibility, endurance, ROM of core, proximal hip and LE for functional self-care, mobility, lifting and ambulation/stair navigation   [] (11641)Reviewed/Progressed HEP activities related to improving balance, coordination, kinesthetic sense, posture, motor skill, proprioception of core, proximal hip and LE for self care, mobility, lifting, and ambulation/stair navigation      Manual Treatments:  PROM / STM / Oscillations-Mobs:  G-I, II, III, IV (PA's, Inf., Post.)  [x] (91623) Provided manual therapy to mobilize LE, proximal hip and/or LS spine soft tissue/joints for the purpose of modulating pain, promoting relaxation,  increasing ROM, reducing/eliminating soft tissue swelling/inflammation/restriction, improving soft tissue extensibility and allowing for proper ROM for normal function with self care, mobility, lifting and ambulation. Modalities:     [] GAME READY (VASO)- for significant edema, swelling, pain control. Charges:  Timed Code Treatment Minutes: 35'   Total Treatment Minutes: 35'     [] EVAL (LOW) 455 1011   [] EVAL (MOD) 15309  [] EVAL (HIGH) 89439   [] RE-EVAL     [x] LZ(89956) x 1    [] IONTO  [] NMR (54545) x     [] VASO  [x] Manual (53823) x  1    [] Other:  [] TA x      [] Mech Traction (55061)  [] ES(attended) (24161)      [] ES (un) (46341):       GOALS:   Patient stated goal: \"Return to sports\"  []? Progressing: []? Met: []? Not Met: []? Adjusted     Therapist goals for Patient:   Short Term Goals: To be achieved in: 2 weeks  1. Independent in HEP and progression per patient tolerance, in order to prevent re-injury. []? Progressing: [x]? Met: []? Not Met: []? Adjusted  2. Patient will have a decrease in pain to facilitate improvement in movement, function, and ADLs as indicated by Functional Deficits. []? Progressing: [x]? Met: []? Not Met: []? Adjusted     Long Term Goals: To be achieved in: 6 weeks  1. Disability index score of 13% or less for the LEFS to assist with reaching prior level of function. []? Progressing: []? Met: []? Not Met: []? Adjusted  2.  Patient will demonstrate increased AROM left ankle DF to 10 degrees, left eversion 10 degrees to allow for proper joint functioning as indicated by patients Functional Deficits. [x]? Progressing: []? Met: []? Not Met: []? Adjusted  3. Patient will demonstrate an increase in Strength in left ankle PF to 4+/5 to allow for proper functional mobility as indicated by patients Functional Deficits. []? Progressing: [x]? Met: []? Not Met: []? Adjusted  4. Patient will be able to balance on uneven surface for 1 minute without increased symptoms or restriction. []? Progressing: [x]? Met: []? Not Met: []? Adjusted  5. Patient will progress to [employer] for return to running or sport. (patient specific functional goal)    []? Progressing: []? Met: []? Not Met: []? Adjusted           Overall Progression Towards Functional goals/ Treatment Progress Update:  [x] Patient is progressing as expected towards functional goals listed. [] Progression is slowed due to complexities/Impairments listed. [] Progression has been slowed due to co-morbidities. [] Plan just implemented, too soon to assess goals progression <30days   [] Goals require adjustment due to lack of progress  [] Patient is not progressing as expected and requires additional follow up with physician  [] Other    Prognosis for POC: [x] Good [] Fair  [] Poor      Patient requires continued skilled intervention: [x] Yes  [] No    Treatment/Activity Tolerance:  [x] Patient able to complete treatment  [] Patient limited by fatigue  [] Patient limited by pain    [] Patient limited by other medical complications  [] Other:     ASSESSMENT:  Patient with no pain with daily activities. Challenged by dynamic balance exercises. Edema still present but overall improved. Will schedule for [facility] when he returns from [place] and may do [employer] and concurrent PT if needed.      Return to Play: (if applicable)   [x]  Stage 1: Intro to Strength   []  Stage 2: Return to Run and Strength   []  Stage 3: Return to Jump and Strength   [] Stage 4: Dynamic Strength and Agility   []  Stage 5: Sport Specific Training     []  Ready to Return to Play, Meets All Above Stages   []  Not Ready for Return to Sports   Comments:                               PLAN: See eval  [x] Continue per plan of care [] Alter current plan (see comments above)  [] Plan of care initiated [] Hold pending MD visit [] Discharge      Electronically signed by:  Kellie Wise PT, DPT [id number]     Note: If patient does not return for scheduled/ recommended follow up visits, this note will serve as a discharge from care along with most recent update on progress.

## 2021-01-19 ENCOUNTER — HOSPITAL ENCOUNTER (OUTPATIENT)
Dept: PHYSICAL THERAPY | Age: 31
Discharge: HOME | End: 2021-01-19

## 2021-01-19 PROCEDURE — 9990000029 HC GAP PACKAGE

## 2021-01-19 NOTE — FLOWSHEET NOTE
[facility] [address] [facility], [address] [place]  Phone: [phone]  Fax [phone]                                            Lower Extremity Daily Performance Training Note  Date:  2021    Patient Name:  Chikis Chawla    :  1990  MRN: [id number]  Restrictions/Precautions:   Medical/Treatment Diagnosis Information:   ·  L Ankle Inversion Sprain - Tackle in soccer - 2020  ·  Soccer - Competitive ,   · Crunch    Physician Information:   Linz - PRN     Visit Number:  paid 210 on 2021    Subjective: Pt states that overall the ankle is improving, does note some loss of strength and balance, confidence with the ankle. Did not bring his ankle brace today    Objective:  Observation: AROM SL DF 5 degrees, BL DF 0 Degrees  MMT 5/5 globally      Exercises:  Exercise/Equipment 2021   Elliptical 3'   HS Mobs 3D x5 each   Calf Hip Flex Mobs 3D x5 each   Lateral Walk with TB Blue 1 lap       Ladders Forward Diagonal 1/4 1/4 each   Hopscotch 1/4    2in1 1in1 1/4 1/4 each       Bilat Squats 1D x10   SL Squats 3D x5 each       Step Matrix 3D x10 each       LLLD BL DF Stretch 3D x2' each (HEP Daily)                       CP 10'     Home Exercise Program: See above    Patient Education:    Reviewed the nature of ankle sprains and how his lack of SL-BL DF will inhibit his ability to do the higher levels of soccer activities that he want to do. Discussed return to play matrix and good vs bad sore.  Pt voiced good understanding    Assessment:  [x] Patient tolerated treatment well [] Patient limited by fatigue  [] Patient limited by pain  [] Patient limited by other medical complications  [] Other:     Prognosis: [x] Good [] Fair  [] Poor    Patient Requires Follow-up: [x] Yes  [] No    Plan:   [] Continue per plan of care [] Alter current plan (see comments)  [x] Plan of care initiated [] Hold pending MD visit [] Discharge    Electronically signed by:  MEDARDO Mosqueda ATC     Tx was performed by ATC as a part of the [facility] program following PT's recommendations/guidance.        Cosigned by:

## 2021-01-27 ENCOUNTER — HOSPITAL ENCOUNTER (OUTPATIENT)
Dept: PHYSICAL THERAPY | Age: 31
Discharge: HOME | End: 2021-01-27

## 2021-01-27 NOTE — FLOWSHEET NOTE
[facility] [address] [facility], [address] [place]  Phone: [phone]  Fax [phone]                                            Lower Extremity Daily Performance Training Note  Date:  2021    Patient Name:  Monica Montalvo    :  1990  MRN: [id number]  Restrictions/Precautions:   Medical/Treatment Diagnosis Information:   ·  L Ankle Inversion Sprain - Tackle in soccer - 2020  ·  Soccer - Competitive ,   · Crunch    Physician Information:   Dariusz Perry - PRN     Visit Number:  paid 210 on 2021    Subjective: Pt states that overall the ankle is improving and states that he has been stretching at home and that is really helping. He states that he has had no soreness. Objective:  Observation: AROM SL DF 5 degrees, BL DF 0 Degrees  MMT 5/5 globally      Exercises:  Exercise/Equipment 2021   Elliptical 3'   HS Mobs 3D x5 each   Calf Hip Flex Mobs 3D x5 each   Lateral Walk with TB Blue 1 lap       Ladders Forward Diagonal /3 1/2 each   Hopscotch bilat- single //3 -    2in1 1in1 1/4 1/3 1/2 each   Lateral Shuffle     Backpedal     Carioca  4       SL Squats 3D 3#  x10 each       Step Matrix 3D on airex pad 10x5\" each               Leg Press Plyos   #  80% BW Bilat      60% BW R/L   130# 3x10  100# 3x10 each       Banded Inversion and Eversion 3x12 HEP       CP 10'     Home Exercise Program: See above    Patient Education:    Reviewed the nature of ankle sprains and how his lack of SL-BL DF will inhibit his ability to do the higher levels of soccer activities that he want to do. Discussed return to play matrix and good vs bad sore.  Pt voiced good understanding    Assessment:  [x] Patient tolerated treatment well [] Patient limited by fatigue  [] Patient limited by pain  [] Patient limited by other medical complications  [] Other:     Prognosis: [x] Good [] Fair  [] Poor    Patient Requires Follow-up: [x] Yes  [] No    Plan:   [] Continue per plan of care [] Alter current plan (see comments)  [x] Plan of care initiated [] Hold pending MD visit [] Discharge    Electronically signed by:  MEDARDO Daniels, CHICHI Culver     Tx was performed by ATC as a part of the [facility] program following PT's recommendations/guidance.        Cosigned by:

## 2021-02-03 ENCOUNTER — HOSPITAL ENCOUNTER (OUTPATIENT)
Dept: PHYSICAL THERAPY | Age: 31
Discharge: HOME | End: 2021-02-03

## 2021-02-03 NOTE — FLOWSHEET NOTE
[facility] [facility] [address] [facility], [address] [place]  Phone: [phone]  Fax [phone]                                            Lower Extremity Daily Performance Training Note  Date:  2/3/2021    Patient Name:  Lauren Julio    :  1990  MRN: [id number]  Restrictions/Precautions:   Medical/Treatment Diagnosis Information:   ·  L Ankle Inversion Sprain - Tackle in soccer - 2020  ·  Soccer - Competitive ,   · Crunch    Physician Information:   Fredderick Canavan - PRN     Visit Number: 3/7 paid 210 on 2021    Subjective: Pt states that overall he is doing well just a little stiff, but is still doing strength and stretching exercises at home. Objective:  Observation: AROM SL DF 5 degrees, BL DF 0 Degrees  MMT 5/5 globally      Exercises:  Exercise/Equipment 2-3-2021   Elliptical 3'   HS Mobs 3D x5 each   Calf Hip Flex Mobs 3D x5 each   Lateral Walk with TB Blue 2 lap       Ladders Forward Diagonal  1/2 3/ each   Hopscotch bilat- single  1/2 -  1/2   2in1 1in1 2 3/4 each   Lateral Shuffle- turn and jog 1/4 1/3 1/2   Backpedal - turn and jog 1/4 1/3 1/2   Carioca  1/4 1/3 1/2       SL Squats 3D 5#  x10 each       Step Matrix 3D on airex pad 10x5\" each               Leg Press Plyos   # 7 holes showing  80% BW Bilat      60% BW R/L   130# 3x12  100# 3x12 each       Banded Inversion and Eversion 3x12 HEP       CP 10'     Home Exercise Program: See above    Patient Education:    Reviewed the nature of ankle sprains and how his lack of SL-BL DF will inhibit his ability to do the higher levels of soccer activities that he want to do. Discussed return to play matrix and good vs bad sore.  Pt voiced good understanding    Assessment:  [x] Patient tolerated treatment well [] Patient limited by fatigue  [] Patient limited by pain  [] Patient limited by other medical complications  [] Other:     Prognosis: [x] Good [] Fair  [] Poor    Patient Requires Follow-up: [x] Yes  [] No    Plan:   [] Continue per plan of care [] Alter current plan (see comments)  [x] Plan of care initiated [] Hold pending MD visit [] Discharge    Electronically signed by:  MEDARDO Ruano, ATC  CHICHI Ferrari     Tx was performed by ATC as a part of the [facility] program following PT's recommendations/guidance.        Cosigned by:

## 2021-02-10 ENCOUNTER — HOSPITAL ENCOUNTER (OUTPATIENT)
Dept: PHYSICAL THERAPY | Age: 31
Discharge: HOME | End: 2021-02-10

## 2021-02-10 NOTE — FLOWSHEET NOTE
[place] [facility] [address] [facility], [address]  Phone: [phone]  Fax [phone]                                            Lower Extremity Daily Performance Training Note  Date:  2/10/2021    Patient Name:  Lauren Julio    :  1990  MRN: [id number]  Restrictions/Precautions:   Medical/Treatment Diagnosis Information:   ·  L Ankle Inversion Sprain - Tackle in soccer - 2020  ·  Soccer - Competitive ,   · Crunch    Physician Information:   Linz - PRN     Visit Number:  paid 210 on 2021    Subjective: Pt states that overall he is doing well. Objective:  Observation: AROM SL DF 5 degrees, BL DF 0 Degrees  MMT 5/5 globally      Exercises:  Exercise/Equipment 2-   Elliptical 3'   HS Mobs 3D x5 each   Calf Hip Flex Mobs 3D x5 each   Lateral Walk with TB Blue 2 lap       Ladders Forward Diagonal /2 3/ 90% each   Hopscotch bilat- single  - /2   2in1 1in1 /2 3/4 each   Lateral Shuffle turn and jog /3 2/3 90%   Backpedal turn and jog 1/3 2/3 90%   Carioca  /3 2/3 90%       SL Squats 3D 5#  x12 each           4 Square Plyos  BIlat 1-2, 1-4, 1-2-3, 1-3-2, 1-2-3-4, 1-4-3-2  R/L 1-2, 1-4, 1-3, 4-2   X20\" each  X10\" each           Banded Inversion and Eversion 3x12 HEP       CP 10'     Home Exercise Program: See above    Patient Education:    Reviewed the nature of ankle sprains and how his lack of SL-BL DF will inhibit his ability to do the higher levels of soccer activities that he want to do. Discussed return to play matrix and good vs bad sore.  Pt voiced good understanding    Assessment: Ankle tolerated exercises well but very fatigued cardiovascularly  [x] Patient tolerated treatment well [] Patient limited by fatigue  [] Patient limited by pain  [] Patient limited by other medical complications  [] Other:     Prognosis: [x] Good [] Fair  [] Poor    Patient Requires Follow-up: [x] Yes  [] No    Plan:   [] Continue per plan of care [] Alter current plan (see comments)  [x] Plan of care initiated [] Hold pending MD visit [] Discharge    Electronically signed by:  MEDARDO De Dios, CHICHI Culver     Tx was performed by ATC as a part of the [facility] program following PT's recommendations/guidance.        Cosigned by:

## 2021-02-17 ENCOUNTER — HOSPITAL ENCOUNTER (OUTPATIENT)
Dept: PHYSICAL THERAPY | Age: 31
Discharge: HOME | End: 2021-02-17

## 2021-02-17 NOTE — FLOWSHEET NOTE
[place] [address] [facility], [address]  Phone: [phone]  Fax [phone]                                            Lower Extremity Daily Performance Training Note  Date:  2021    Patient Name:  Cruz Ruano    :  1990  MRN: [id number]  Restrictions/Precautions:   Medical/Treatment Diagnosis Information:   ·  L Ankle Inversion Sprain - Tackle in soccer - 2020  ·  Soccer - Competitive ,   · Crunch    Physician Information:   Amna COLLINS     Visit Number:  paid 210 on 2021    Subjective: Pt states that overall he is doing well. Objective:  Observation: AROM SL DF 5 degrees, BL DF 0 Degrees  MMT 5/5 globally      Exercises:  Exercise/Equipment 2021   Elliptical 3'   HS Mobs 3D x5 each   Calf Hip Flex Mobs 3D x5 each   Lateral Walk with TB Blue 2 lap       Ladders Forward Diagonal 1/4 1/2 3/4 90% each   Hopscotch bilat- single  - 2   2in1 1in1 1/4 1/2 3/4 90% each   Lateral Shuffle turn and jog 1/3 2/3 90%   Backpedal turn and jog 1/3 2/3 90%   Carioca  1/3 2/3 90%       SL Squats 3D 5#  x12 each           4 Square Plyos  BIlat 1-2, 1-4, 1-2-3, 1-3-2, 1-2-3-4, 1-4-3-2  R/L 1-2, 1-4, 1-3, 4-2   X20\" each  X10\" each   Modified T 1/4 1/3 12   Pro Shuttle L/R 1/4 1/3 1/2           Banded Inversion and Eversion 3x12 HEP         Home Exercise Program: See above    Patient Education:    Reviewed the nature of ankle sprains and how his lack of SL-BL DF will inhibit his ability to do the higher levels of soccer activities that he want to do. Discussed return to play matrix and good vs bad sore.  Pt voiced good understanding    Assessment: Ankle tolerated exercises well but very fatigued cardiovascularly  [x] Patient tolerated treatment well [] Patient limited by fatigue  [] Patient limited by pain  [] Patient limited by other medical complications  [] Other:     Prognosis: [x] Good [] Fair  [] Poor    Patient Requires Follow-up: [x] Yes  [] No    Plan:   [] Continue per plan of care [] Alter current plan (see comments)  [x] Plan of care initiated [] Hold pending MD visit [] Discharge    Electronically signed by:  MEDARDO Calabrese, CHICHI Culver     Tx was performed by ATC as a part of the [facility] program following PT's recommendations/guidance.        Cosigned by:

## 2021-02-24 ENCOUNTER — HOSPITAL ENCOUNTER (OUTPATIENT)
Dept: PHYSICAL THERAPY | Age: 31
Discharge: HOME | End: 2021-02-24

## 2021-02-24 NOTE — FLOWSHEET NOTE
[place] [address] [facility], [address] [place]  Phone: [phone]  Fax [phone]                                            Lower Extremity Daily Performance Training Note  Date:  2021    Patient Name:  Nicole Gonzalez    :  1990  MRN: [id number]  Restrictions/Precautions:   Medical/Treatment Diagnosis Information:   ·  L Ankle Inversion Sprain - Tackle in soccer - 2020  ·  Soccer - Competitive ,   · Crunch    Physician Information:   Linz - PRN     Visit Number:  paid 210 on 2021    Subjective: Pt states that overall he is doing well. Objective:  Observation: AROM SL DF 5 degrees, BL DF 0 Degrees  MMT 5/5 globally      Exercises:  Exercise/Equipment 2021   Elliptical 3'   HS Mobs 3D x5 each   Calf Hip Flex Mobs 3D x5 each   Lateral Walk with TB Blue 3 lap       Ladders Forward Diagonal /2 3/ 100% each   Hopscotch bilat- single 1/2 - //2   2in1 1in1 /4 /2 3/4 100% each   Lateral Shuffle turn and jog /2 3/ 100%   Backpedal turn and jog /2 3/ 100%   Carioca  /2 3/ 100%       SL Squats 3D 5#  x12 each           4 Square Plyos  BIlat 1-2, 1-4, 1-2-3, 1-3-2, 1-2-3-4, 1-4-3-2  R/L 1-2, 1-4, 1-3, 4-2   X20\" each  X10\" each   Modified T  1/2 3/4   Pro Shuttle L/R //2 3/4           Banded Inversion and Eversion 3x12 HEP         Home Exercise Program: Work on R SLS endurance during ADL's    Patient Education:    Reviewed the nature of ankle sprains and how his lack of SL-BL DF will inhibit his ability to do the higher levels of soccer activities that he want to do. Discussed return to play matrix and good vs bad sore.  Pt voiced good understanding    Assessment: Ankle tolerated exercises well but very fatigued cardiovascularly  [x] Patient tolerated treatment well [] Patient limited by fatigue  [] Patient limited by pain  [] Patient limited by other medical complications  [] Other:     Prognosis: [x] Good [] Fair  [] Poor    Patient Requires Follow-up: [x] Yes  [] No    Plan:   [] Continue per plan of care [] Alter current plan (see comments)  [x] Plan of care initiated [] Hold pending MD visit [] Discharge    Electronically signed by:  MEDARDO Mckeon, ATC  CHICHI Ferrari     Tx was performed by ATC as a part of the [facility] program following PT's recommendations/guidance.        Cosigned by:

## 2021-03-03 ENCOUNTER — OFFICE VISIT (OUTPATIENT)
Dept: ORTHOPEDIC SURGERY | Age: 31
End: 2021-03-03

## 2021-03-03 VITALS — BODY MASS INDEX: 25.11 KG/M2 | HEIGHT: 67 IN | WEIGHT: 160 LBS

## 2021-03-03 DIAGNOSIS — M54.50 LUMBAR SPINE PAIN: Primary | ICD-10-CM

## 2021-03-03 PROCEDURE — 99213 OFFICE O/P EST LOW 20 MIN: CPT | Performed by: PHYSICIAN ASSISTANT

## 2021-03-03 RX ORDER — CYCLOBENZAPRINE HCL 10 MG
10 TABLET ORAL 3 TIMES DAILY PRN
Qty: 21 TABLET | Refills: 0 | Status: SHIPPED | OUTPATIENT
Start: 2021-03-03 | End: 2021-03-13

## 2021-03-03 RX ORDER — METHYLPREDNISOLONE 4 MG/1
TABLET ORAL
Qty: 1 KIT | Refills: 0 | Status: SHIPPED | OUTPATIENT
Start: 2021-03-03 | End: 2021-03-15

## 2021-03-03 NOTE — PROGRESS NOTES
Subjective    Chief Complaint   Patient presents with    Back Pain     Has had chronic back pain on/off for 10 years. Typically manages any pain with stretching/lifting program. This weekend was dead lifting and felt a pain in his back. Reports it seizing up with initial injury. Today he reports centralized achiness. No leg pain or radicular symptoms. Jacob Castro is a 31 y.o. male who presents today for evaluation of low back pain. he describes the pain as aching and throbbing and it is intermittent, moderate. he does remember a specific injury that started the pain. standing and walking makes the pain worse. rest  makes the pain better. The pain does not radiate to the lower extremities. he denies changes in bowel or bladder habits. Pt has had off and on back pain for 10 years and a couple weeks ago he was doing dead lifts and felt pain after in his low back. Has had some spasms. No weakness in the legs. Patient's medications, allergies, past medical, surgical, social and family histories were reviewed and updated as appropriate. The pain assessment was noted & is as follows:  Pain Assessment  Location of Pain: Back  Location Modifiers: Posterior  Severity of Pain: 4  Quality of Pain: Dull, Aching  Duration of Pain: Persistent  Frequency of Pain: Constant  Aggravating Factors: Straightening, Stretching, Bending  Limiting Behavior: Yes  Relieving Factors: Rest  Result of Injury: Yes  Work-Related Injury: No  Are there other pain locations you wish to document?: No    Physical Exam  Constitutional:  Pt well groomed, no acute distress, well developed, no obvious deformities  Vitals:    03/03/21 1730   Weight: 160 lb (72.6 kg)   Height: 5' 7\" (1.702 m)     -Oriented to person, place, and time  -mood and affect are appropriate    Lumbar Exam:  he  is well-developed, well-nourished, oriented to person, place, and time.  his  mood and affect are appropriate.     -his gait is Abnormal. -Motor strength is 5/5 throughout both lower extremities. -Lumbar range of motion is limited in flexion and extension secondary to pain. -he is Tender to palpation over the lower lumbar vertebrae. -There is decrease of the normal lumbar lordosis secondary to pain. Pain mainly over L3-L5 and paraspinal in nature    Neurological:  -Deep tendon reflexes at elbow and wrist are symmetric bilaterally. Good patella reflex  -Blurry, double visionis not noted. -NVI to lower extremities bilaterally. Skin:  No abrasions, lesions, cellulitic changes, obvious deformities noted     Xray:   No orders to display     2 v l spine  No obvious fracture. Good curvature of spine. Good disc space noted.  No sts    Assessment:  Strain LS spine    Plan: rest the injured area as much as practical, apply ice packs  Medrol dose pack  Flexeril 10 tid for 7 days  F/u with spine in 2-3 weeks for recheck

## 2021-03-15 ENCOUNTER — OFFICE VISIT (OUTPATIENT)
Dept: ORTHOPEDIC SURGERY | Age: 31
End: 2021-03-15

## 2021-03-15 VITALS — WEIGHT: 160 LBS | HEIGHT: 67 IN | BODY MASS INDEX: 25.11 KG/M2

## 2021-03-15 DIAGNOSIS — M51.36 DDD (DEGENERATIVE DISC DISEASE), LUMBAR: Primary | ICD-10-CM

## 2021-03-15 DIAGNOSIS — S39.012A STRAIN OF LUMBAR REGION, INITIAL ENCOUNTER: ICD-10-CM

## 2021-03-15 PROCEDURE — 99203 OFFICE O/P NEW LOW 30 MIN: CPT | Performed by: PHYSICIAN ASSISTANT

## 2021-03-15 NOTE — PROGRESS NOTES
New Patient: SPINE    3/15/2021     CHIEF COMPLAINT:    Chief Complaint   Patient presents with    Back Pain     NP LUMBAR       HISTORY OF PRESENT ILLNESS:              The patient is a 31 y.o. male referred by the after-hours clinic for low back pain. He reports a 2-week history of initially constant aching midline low back pain. He states symptoms may have began after doing dead lifting. His symptoms were initially increased with any prolonged activity. Some relief with resting. Conservative care includes evaluation at the [facility], MDP, ibuprofen, Flexeril, bracing. At this time he reports 80% improvement overall. He currently denies any lower extremity radiating pain. Denies any progressive numbness tingling weakness. No recent bowel or bladder dysfunction. Denies any direct injury or trauma. He does state in the past he has had some intermittent chronic episodic low back pain. No past medical history on file. Pain Assessment  Location of Pain: Back  Severity of Pain: 2  Quality of Pain: Dull, Aching  Duration of Pain: Persistent  Frequency of Pain: Intermittent  Aggravating Factors: Bending  Limiting Behavior: Some  Relieving Factors: Rest, Other (Comment)  Work-Related Injury: No  Are there other pain locations you wish to document?: No    The pain assessment was noted & reviewed in the medical record today. Current/Past Treatment:   · Physical Therapy:   · Chiropractic:     · Injection:     Medications:            NSAIDS: Ibuprofen            Muscle relaxer:   Flexeril            Steriods:   MDP            Neuropathic medications:              Opioids:            Other:   · Surgery/Consult:    Work Status/Functionality: Contractor    Past Medical History: Medical history form was reviewed today & scanned into the media tab  No past medical history on file. Past Surgical History:     No past surgical history on file. Current Medications:   No current outpatient medications on file. Allergies:  Patient has no known allergies. Social History:    reports that he has never smoked. He has never used smokeless tobacco. He reports that he does not drink alcohol or use drugs. Family History:   No family history on file. REVIEW OF SYSTEMS: Full ROS reviewed & scanned into chart  CONSTITUTIONAL: Denies unexplained weight loss, fevers   SKIN: Denies skin conditions, skin cancer  ENT: Denies Headaches, dizziness, nosebleeds  RESPIRATORY: Denies current dyspnea, difficulty breathing  CARDIOVASCULAR: Denies chest pain, dyspnea  NEUROLOGICAL: Denies stroke, unsteady gait or progressive weakness  PSYCHOLOGICAL: Denies anxiety, depression   HEMATOLOGIC: Denies blood disorders, cancer  ENDOCRINE: Denies excessive thirst, urination, heat/cold  GI: Denies ulcer, nausea, vomiting, diarrhea   : Denies bowel or bladder incontinence       PHYSICAL EXAM:    Vitals: Height 5' 7\" (1.702 m), weight 160 lb (72.6 kg). Pain 2/10    GENERAL EXAM:  · General Apparence: Patient is adequately groomed with no evidence of malnutrition. · Orientation: The patient is oriented to time, place and person. · Mood & Affect:The patient's mood and affect are appropriate   · Lymphatic: The lymphatic examination bilaterally reveals all areas to be without enlargement or induration  · Sensation: Sensation is intact without deficit  · Coordination/Balance: Good coordination   LUMBAR/SACRAL EXAMINATION:  · Inspection: Local inspection shows no step-off or bruising. Lumbar alignment is normal.  Sagittal and Coronal balance is neutral.      · Palpation: He points to L5 level and below as to where pain is located. No focal tenderness at midline. No evidence of tenderness at the midline. No tenderness bilaterally at the paraspinal or trochanters. There is no step-off or paraspinal spasm. · Range of Motion: Full flexion, mild loss extension without pain today  · Strength:   Strength testing is 5/5 in all muscle groups tested.    · Special Tests:   Straight leg raise and crossed SLR negative. Leg length and pelvis level.  0 out of 5 Jimmy's signs. · Skin: There are no rashes, ulcerations or lesions. · Reflexes: Reflexes are symmetrically 2+ at the patellar and ankle tendons. Clonus absent bilaterally at the feet. · Gait & station: Normal unassisted  · Additional Examinations:   · RIGHT LOWER EXTREMITY: Inspection/examination of the right lower extremity does not show any tenderness, deformity or injury. Range of motion is full. There is no gross instability. There are no rashes, ulcerations or lesions. Strength and tone are normal.  ·   · LEFT LOWER EXTREMITY:  Inspection/examination of the left lower extremity does not show any tenderness, deformity or injury. Range of motion is full. There is no gross instability. There are no rashes, ulcerations or lesions. Strength and tone are normal.    Diagnostic Testin views lumbar spine 3/3/2021 showing mild chronic appearing wedging and DDD [id number] Scheuermann's. Impression:  1) Acute lumbar strain, improving  2) Anterior wedging/DDD T12-L2      Plan:   1) He is feeling 80% improved overall at this time. Recommend a course of physical therapy. 2) He will cont IBU PRN   3) Ergonomics discussed. He will wear his back brace while working. He is declining any formal work restrictions.   4) F/u 3-4wks, L MRI WO if no further improvement or worsening symptoms      Electronically signed by Katheryn Edouard PA-C, [facility] on 3/15/2021 at 8:23 AM